# Patient Record
Sex: MALE | Race: WHITE | Employment: OTHER | URBAN - METROPOLITAN AREA
[De-identification: names, ages, dates, MRNs, and addresses within clinical notes are randomized per-mention and may not be internally consistent; named-entity substitution may affect disease eponyms.]

---

## 2018-01-10 PROBLEM — G57.31 NEUROPATHY OF RIGHT PERONEAL NERVE: Status: ACTIVE | Noted: 2018-01-10

## 2018-01-10 PROCEDURE — 86235 NUCLEAR ANTIGEN ANTIBODY: CPT | Performed by: OTHER

## 2018-01-10 PROCEDURE — 86141 C-REACTIVE PROTEIN HS: CPT | Performed by: OTHER

## 2018-01-10 PROCEDURE — 86255 FLUORESCENT ANTIBODY SCREEN: CPT | Performed by: OTHER

## 2025-04-02 ENCOUNTER — TELEPHONE (OUTPATIENT)
Dept: SURGERY | Facility: CLINIC | Age: 65
End: 2025-04-02

## 2025-04-02 NOTE — TELEPHONE ENCOUNTER
Patient calling to schedule an appointment with an [facility] doctor for pulsatile tinnitus. Please advise if imaging needed for appointment as patient does not have any and he is scheduled tomorrow 4/3/25.

## 2025-04-02 NOTE — TELEPHONE ENCOUNTER
Message below noted.    Appointment scheduled for 4/03/25 is appropriate. Any imaging will be ordered at appointment.

## 2025-04-02 NOTE — TELEPHONE ENCOUNTER
Message below noted.    Patient scheduled for 4/03/25 for pulsatile tinnitus. No imaging noted in chart.    Routed to Provider.

## 2025-04-03 ENCOUNTER — OFFICE VISIT (OUTPATIENT)
Dept: SURGERY | Facility: CLINIC | Age: 65
End: 2025-04-03
Payer: MEDICARE

## 2025-04-03 VITALS
WEIGHT: 235 LBS | HEIGHT: 69 IN | DIASTOLIC BLOOD PRESSURE: 76 MMHG | SYSTOLIC BLOOD PRESSURE: 128 MMHG | OXYGEN SATURATION: 93 % | BODY MASS INDEX: 34.8 KG/M2 | HEART RATE: 67 BPM

## 2025-04-03 DIAGNOSIS — H93.13 TINNITUS OF BOTH EARS: Primary | ICD-10-CM

## 2025-04-03 PROCEDURE — 99205 OFFICE O/P NEW HI 60 MIN: CPT

## 2025-04-03 RX ORDER — AMLODIPINE BESYLATE 5 MG/1
5 TABLET ORAL 2 TIMES DAILY
COMMUNITY

## 2025-04-03 RX ORDER — BLOOD SUGAR DIAGNOSTIC
1 STRIP MISCELLANEOUS 2 TIMES DAILY
COMMUNITY

## 2025-04-03 RX ORDER — ORAL SEMAGLUTIDE 14 MG/1
1 TABLET ORAL DAILY
COMMUNITY

## 2025-04-03 RX ORDER — METOPROLOL SUCCINATE 50 MG/1
50 TABLET, EXTENDED RELEASE ORAL DAILY
COMMUNITY

## 2025-04-03 RX ORDER — LOSARTAN POTASSIUM AND HYDROCHLOROTHIAZIDE 12.5; 1 MG/1; MG/1
1 TABLET ORAL DAILY
COMMUNITY

## 2025-04-03 RX ORDER — BLOOD-GLUCOSE METER
1 KIT MISCELLANEOUS DAILY
COMMUNITY
Start: 2025-02-10

## 2025-04-03 NOTE — PATIENT INSTRUCTIONS
Refill policies:    Allow 2-3 business days for refills; controlled substances may take longer.  Contact your pharmacy at least 5 days prior to running out of medication and have them send an electronic request or submit request through the “request refill” option in your [employer] account.  Refills are not addressed on weekends; covering physicians do not authorize routine medications on weekends.  No narcotics or controlled substances are refilled after noon on Fridays or by on call physicians.  By law, narcotics must be electronically prescribed.  A 30 day supply with no refills is the maximum allowed.  If your prescription is due for a refill, you may be due for a follow up appointment.  To best provide you care, patients receiving routine medications need to be seen at least once a year.  Patients receiving narcotic/controlled substance medications need to be seen at least once every 3 months.  In the event that your preferred pharmacy does not have the requested medication in stock (e.g. Backordered), it is your responsibility to find another pharmacy that has the requested medication available.  We will gladly send a new prescription to that pharmacy at your request.    Scheduling Tests:    If your physician has ordered radiology tests such as MRI or CT scans, please contact Central Scheduling at [phone] right away to schedule the test.  Once scheduled, the [facility] Centralized Referral Team will work with your insurance carrier to obtain pre-certification or prior authorization.  Depending on your insurance carrier, approval may take 3-10 days.  It is highly recommended patients assure they have received an authorization before having a test performed.  If test is done without insurance authorization, patient may be responsible for the entire amount billed.      Precertification and Prior Authorizations:  If your physician has recommended that you have a procedure or additional testing performed the [facility]  Centralized Referral Team will contact your insurance carrier to obtain pre-certification or prior authorization.    You are strongly encouraged to contact your insurance carrier to verify that your procedure/test has been approved and is a COVERED benefit.  Although the [facility] Centralized Referral Team does its due diligence, the insurance carrier gives the disclaimer that \"Although the procedure is authorized, this does not guarantee payment.\"    Ultimately the patient is responsible for payment.   Thank you for your understanding in this matter.  Paperwork Completion:  If you require FMLA or disability paperwork for your recovery, please make sure to either drop it off or have it faxed to our office at [phone]. Be sure the form has your name and date of birth on it.  The form will be faxed to our Forms Department and they will complete it for you.  There is a 25$ fee for all forms that need to be filled out.  Please be aware there is a 10-14 day turnaround time.  You will need to sign a release of information (LUIS MIGUEL) form if your paperwork does not come with one.  You may call the Forms Department with any questions at [phone].  Their fax number is [phone].

## 2025-04-03 NOTE — PROGRESS NOTES
ORTIZ procedure in hospital No  New patient here for evaluation of pulsatile tinnitus, RONNIE, CPAP. Patient has a history of using smokeless tobacco.   Imaging and date of imaging: none  Family history of pulsatile tinnitus.   History of High Blood Pressure:  Yes  taking losartan, metoprolol, BP medication  Atorvastatin 20 mg,   Neurologist: none  Cardiologist: none  PCP: Dr. Florence-he will be retiring soon, so he will be finding a new PCP.   Review of Systems:    Hand Dominance: right  General: no symptoms reported  Neuro: no symptoms reported  Head:  pulsatile tinnitus, whooshing, both ears, positional   Musculoskeletal: knee and ankle pain   Cardiovascular: no symptoms reported  Gastrointestinal: heartburn  Genitourinary: no symptoms reported  Respiratory: no symptoms reported  Eyes: no symptoms reported-floater left eye  Skin: no symptoms reported  Mouth & throat: no symptoms reported  Neck: no symptoms reported  Nose: no symptoms reported  Psychiatric: no symptoms reported     MRS-0  Barthel- 100

## 2025-04-04 ENCOUNTER — TELEPHONE (OUTPATIENT)
Dept: SURGERY | Facility: CLINIC | Age: 65
End: 2025-04-04

## 2025-04-04 DIAGNOSIS — H93.13 TINNITUS OF BOTH EARS: Primary | ICD-10-CM

## 2025-04-04 NOTE — H&P
[facility]  Neurological Surgery Clinic Note    Sal Kaur  5/14/1960  [id number]  PCP: ANDRZEJ CARUSO MD    REASON FOR VISIT:  ***    HISTORY OF PRESENT ILLNESS:  Sal Kaur is a 64 year old male ***    PAST MEDICAL HISTORY:  No past medical history on file.    PAST SURGICAL HISTORY:  No past surgical history on file.    FAMILY HISTORY:  family history is not on file.    SOCIAL HISTORY:   reports that he has never smoked. He has quit using smokeless tobacco. He reports current alcohol use. He reports that he does not use drugs.    ALLERGIES:  Allergies[1]    MEDICATIONS:  Medications Ordered Prior to Encounter[2]    REVIEW OF SYSTEMS:  A 10-point system was reviewed.  Pertinent positives and negatives are noted in HPI.      PHYSICAL EXAMINATION:  VITAL SIGNS: /76 (BP Location: Right arm, Patient Position: Sitting, Cuff Size: large)   Pulse 67   Ht 69\"   Wt 235 lb (106.6 kg)   SpO2 93%   BMI 34.70 kg/m²     A&Ox3, no acute distress  PERRL, EOMi, FS, TM  Full strength x 4, no drift  Sensation intact   ***    ASSESSMENT:  ***    Plan:  ***    Jean Kirby MD  Neurological Surgery  [facility]    Care Time: *** min including face to face time, chart review, imaging interpretation, and coordination of care         [1] No Known Allergies  [2]   Current Outpatient Medications on File Prior to Visit   Medication Sig Dispense Refill    FERROUS SULFATE OR Take 1 tablet by mouth daily. 65 mg      Cholecalciferol 50 MCG (2000 UT) Oral Cap Take 50 mcg by mouth daily.      Losartan Potassium-HCTZ 100-12.5 MG Oral Tab Take 1 tablet by mouth daily.      metoprolol succinate ER 50 MG Oral Tablet 24 Hr Take 1 tablet (50 mg total) by mouth daily.      RYBELSUS 14 MG Oral Tab Take 1 tablet by mouth daily.      Blood Glucose Monitoring Suppl (ONETOUCH VERIO REFLECT) w/Device Does not apply Kit Apply 1  Application topically daily.      atorvastatin 20 MG Oral Tab Take 1 tablet by mouth in the evening 30 tablet     MetFORMIN HCl  MG Oral Tablet 24 Hr Take 2 tablets by mouth daily 60 tablet 0    amLODIPine 5 MG Oral Tab Take 1 tablet (5 mg total) by mouth 2 (two) times daily.      Glucose Blood (ONETOUCH VERIO) In Vitro Strip 1 strip by In Vitro route 2 (two) times daily.       No current facility-administered medications on file prior to visit.

## 2025-04-04 NOTE — TELEPHONE ENCOUNTER
Patient contacting the office stating that he attempted to schedule the requested imaging for MRI and MRA however, there is no order for sedated imaging. Patient is requesting the orders be updated to reflect sedation as he is claustrophobic. Please update the order and contact the patient directly so he can schedule. Thank you.

## 2025-04-07 NOTE — PROGRESS NOTES
[facility]  Interventional Neuroradiology Clinic Note      ANDRZEJ CARUSO MD  Primary Care      Date of Service: 4/3/2025     Dear Colleagues,     We had the pleasure of seeing Sal Kaur in the Interventional Neuroradiology Clinic at [facility] for tinnitus.     + constant, chronic, bilateral, \"pulsating\" tinnitus, more consistent recently when laying down at night, driving, or when upset (\"in situations where BP rises\"), turning head a certain way in bed sometimes helps it go away, 2-3/10 affecting ADLs    Review of Systems:     Hand Dominance: right  General: no symptoms reported  Neuro: no symptoms reported  Head:  pulsatile tinnitus, whooshing, both ears, positional   Musculoskeletal: knee and ankle pain   Cardiovascular: no symptoms reported  Gastrointestinal: heartburn  Genitourinary: no symptoms reported  Respiratory: no symptoms reported  Eyes: no symptoms reported-floater left eye  Skin: no symptoms reported  Mouth & throat: no symptoms reported  Neck: no symptoms reported  Nose: no symptoms reported  Psychiatric: no symptoms reported      MRS-0  Barthel- 100      Past Medical/Surgical History:  No past medical history on file.    No past surgical history on file.    Social History:    reports that he has never smoked. He has quit using smokeless tobacco. He reports current alcohol use. He reports that he does not use drugs.      Family History:  No family history on file.      Medications:    Current Outpatient Medications:     FERROUS SULFATE OR, Take 1 tablet by mouth daily. 65 mg, Disp: , Rfl:     Cholecalciferol 50 MCG (2000 UT) Oral Cap, Take 50 mcg by mouth daily., Disp: , Rfl:     Losartan Potassium-HCTZ 100-12.5 MG Oral Tab, Take 1 tablet by mouth daily., Disp: , Rfl:     metoprolol succinate ER 50 MG Oral Tablet 24 Hr, Take 1 tablet (50 mg total) by mouth daily., Disp: , Rfl:     RYBELSUS 14 MG Oral Tab, Take 1 tablet by mouth daily., Disp: , Rfl:     Blood  Glucose Monitoring Suppl (ONETOUCH VERIO REFLECT) w/Device Does not apply Kit, Apply 1 Application topically daily., Disp: , Rfl:     atorvastatin 20 MG Oral Tab, Take 1 tablet by mouth in the evening, Disp: 30 tablet, Rfl:     MetFORMIN HCl  MG Oral Tablet 24 Hr, Take 2 tablets by mouth daily, Disp: 60 tablet, Rfl: 0    amLODIPine 5 MG Oral Tab, Take 1 tablet (5 mg total) by mouth 2 (two) times daily., Disp: , Rfl:     Glucose Blood (ONETOUCH VERIO) In Vitro Strip, 1 strip by In Vitro route 2 (two) times daily., Disp: , Rfl:     Allergies:   Allergies[1]    Physical Exam:    Does not change sound with jugular compression    VITAL SIGNS: /76 (BP Location: Right arm, Patient Position: Sitting, Cuff Size: large)   Pulse 67   Ht 69\"   Wt 235 lb (106.6 kg)   SpO2 93%   BMI 34.70 kg/m²   GENERAL:  Patient is a 64 year old male in no acute distress.    NEUROLOGICAL:    Mental status: Oriented to person, place, and time   Speech: Fluent, no dysarthria  Memory and comprehension: Intact   Cranial Nerves: PERRL, EOMI, no nystagmus, facial sensation intact, face symmetric, tongue midline, shoulder shrug equal, remainder CN intact  Motor: No drift, no focal arm or leg weakness  Sensory: Intact to light touch  Gait: Deferred         Imaging: none to review         Assessment/Plan:      Bilateral tinnitus     - Obtain MRI brain IAC, MRA head + neck w/wo, & CTV     - Pt requests anesthesia for MR imaging due to extreme claustrophobia       Imaging reviewed and case discussed with Dr. Valencia    4/7/2025 9:12 AM      I spent approximately 60 minutes reviewing the patient's chart and imaging, with at least half the time spent on counseling the patient on their pathology and conservative medical management/treatment plan.       [1] No Known Allergies

## 2025-04-07 NOTE — TELEPHONE ENCOUNTER
Please call Renetta @ Central Scheduling would like a call back to go over the Imaging orders, for some reason  the MRI /MRA with Sedation is not showing on the order.  Please call her @ [phone]

## 2025-04-07 NOTE — TELEPHONE ENCOUNTER
Notified pt of the below- apologized for inconvenience. He will call central scheduling tomorrow and let me know if any further issues.

## 2025-04-07 NOTE — TELEPHONE ENCOUNTER
Orders have all been placed with anesthesia.     Please call patient and advise him to call and attempt to reschedule.     Thank you!

## 2025-04-07 NOTE — TELEPHONE ENCOUNTER
I have now spoken with people from central scheduling twice regarding these image orders. I have been placing them with anesthesia, and for some reason they are stating they are unable to see it on their end.     I just got confirmation that they now can see it. I don't know what was happening on their end.     Please advise patient and apologize for the inconvenience.    Thank you!

## 2025-04-07 NOTE — TELEPHONE ENCOUNTER
Message below noted.    Called patient to advise.     Patient acknowledged and appreciative of call.    Nothing needed further with this encounter.

## 2025-04-10 ENCOUNTER — HOSPITAL ENCOUNTER (OUTPATIENT)
Dept: CT IMAGING | Facility: HOSPITAL | Age: 65
Discharge: HOME | End: 2025-04-10
Payer: MEDICARE

## 2025-04-10 DIAGNOSIS — H93.13 TINNITUS OF BOTH EARS: ICD-10-CM

## 2025-04-10 LAB
CREAT BLD-MCNC: 1.5 MG/DL (ref 0.7–1.3)
EGFRCR SERPLBLD CKD-EPI 2021: 51 ML/MIN/1.73M2 (ref 60–?)

## 2025-04-10 PROCEDURE — 70496 CT ANGIOGRAPHY HEAD: CPT

## 2025-04-10 PROCEDURE — 82565 ASSAY OF CREATININE: CPT

## 2025-05-12 VITALS — BODY MASS INDEX: 33.64 KG/M2 | HEIGHT: 70 IN | WEIGHT: 235 LBS

## 2025-05-12 RX ORDER — NICOTINE POLACRILEX 4 MG
30 LOZENGE BUCCAL
OUTPATIENT
Start: 2025-05-12

## 2025-05-12 RX ORDER — NICOTINE POLACRILEX 4 MG
15 LOZENGE BUCCAL
OUTPATIENT
Start: 2025-05-12

## 2025-05-12 RX ORDER — DEXTROSE MONOHYDRATE 25 G/50ML
50 INJECTION, SOLUTION INTRAVENOUS
OUTPATIENT
Start: 2025-05-12

## 2025-05-12 RX ORDER — SODIUM CHLORIDE 9 MG/ML
INJECTION, SOLUTION INTRAVENOUS CONTINUOUS
OUTPATIENT
Start: 2025-05-12

## 2025-05-14 NOTE — IMAGING NOTE
Spoke with Epifanio sánchez/srinivasan Garciabelsus (semaglutide, GLP1 inhibitor). Epifanio reports he takes this daily and instruction given to hold Rybelsus  the morning of the MRI. Please also hold metformin and losartan the morning of the MRI.

## 2025-05-19 ENCOUNTER — ANESTHESIA EVENT (OUTPATIENT)
Dept: MRI IMAGING | Facility: HOSPITAL | Age: 65
End: 2025-05-19
Payer: MEDICARE

## 2025-05-19 ENCOUNTER — ANESTHESIA (OUTPATIENT)
Dept: MRI IMAGING | Facility: HOSPITAL | Age: 65
End: 2025-05-19
Payer: MEDICARE

## 2025-05-19 ENCOUNTER — HOSPITAL ENCOUNTER (OUTPATIENT)
Dept: MRI IMAGING | Facility: HOSPITAL | Age: 65
Discharge: HOME | End: 2025-05-19
Payer: MEDICARE

## 2025-05-19 ENCOUNTER — APPOINTMENT (OUTPATIENT)
Dept: LAB | Facility: HOSPITAL | Age: 65
End: 2025-05-19
Payer: MEDICARE

## 2025-05-19 ENCOUNTER — TELEPHONE (OUTPATIENT)
Dept: SURGERY | Facility: CLINIC | Age: 65
End: 2025-05-19

## 2025-05-19 VITALS
BODY MASS INDEX: 33.64 KG/M2 | TEMPERATURE: 98 F | HEART RATE: 65 BPM | HEIGHT: 70 IN | SYSTOLIC BLOOD PRESSURE: 124 MMHG | DIASTOLIC BLOOD PRESSURE: 75 MMHG | WEIGHT: 235 LBS | RESPIRATION RATE: 16 BRPM | OXYGEN SATURATION: 94 %

## 2025-05-19 DIAGNOSIS — H93.13 TINNITUS OF BOTH EARS: ICD-10-CM

## 2025-05-19 LAB
GLUCOSE BLD-MCNC: 111 MG/DL (ref 70–99)
GLUCOSE BLD-MCNC: 127 MG/DL (ref 70–99)

## 2025-05-19 PROCEDURE — 70546 MR ANGIOGRAPH HEAD W/O&W/DYE: CPT

## 2025-05-19 PROCEDURE — 70553 MRI BRAIN STEM W/O & W/DYE: CPT

## 2025-05-19 PROCEDURE — A9575 INJ GADOTERATE MEGLUMI 0.1ML: HCPCS

## 2025-05-19 PROCEDURE — 70549 MR ANGIOGRAPH NECK W/O&W/DYE: CPT

## 2025-05-19 PROCEDURE — 82962 GLUCOSE BLOOD TEST: CPT

## 2025-05-19 RX ORDER — DEXTROSE MONOHYDRATE 25 G/50ML
50 INJECTION, SOLUTION INTRAVENOUS
Status: DISCONTINUED | OUTPATIENT
Start: 2025-05-19 | End: 2025-05-21

## 2025-05-19 RX ORDER — SODIUM CHLORIDE, SODIUM LACTATE, POTASSIUM CHLORIDE, CALCIUM CHLORIDE 600; 310; 30; 20 MG/100ML; MG/100ML; MG/100ML; MG/100ML
INJECTION, SOLUTION INTRAVENOUS CONTINUOUS PRN
Status: DISCONTINUED | OUTPATIENT
Start: 2025-05-19 | End: 2025-05-19 | Stop reason: SURG

## 2025-05-19 RX ORDER — HYDROMORPHONE HYDROCHLORIDE 1 MG/ML
0.2 INJECTION, SOLUTION INTRAMUSCULAR; INTRAVENOUS; SUBCUTANEOUS EVERY 5 MIN PRN
Status: DISCONTINUED | OUTPATIENT
Start: 2025-05-19 | End: 2025-05-19

## 2025-05-19 RX ORDER — HYDROCODONE BITARTRATE AND ACETAMINOPHEN 5; 325 MG/1; MG/1
1 TABLET ORAL ONCE AS NEEDED
Status: DISCONTINUED | OUTPATIENT
Start: 2025-05-19 | End: 2025-05-19

## 2025-05-19 RX ORDER — LIDOCAINE HYDROCHLORIDE 10 MG/ML
INJECTION, SOLUTION EPIDURAL; INFILTRATION; INTRACAUDAL; PERINEURAL AS NEEDED
Status: DISCONTINUED | OUTPATIENT
Start: 2025-05-19 | End: 2025-05-19 | Stop reason: SURG

## 2025-05-19 RX ORDER — ROCURONIUM BROMIDE 10 MG/ML
INJECTION, SOLUTION INTRAVENOUS AS NEEDED
Status: DISCONTINUED | OUTPATIENT
Start: 2025-05-19 | End: 2025-05-19 | Stop reason: SURG

## 2025-05-19 RX ORDER — HYDROCODONE BITARTRATE AND ACETAMINOPHEN 5; 325 MG/1; MG/1
2 TABLET ORAL ONCE AS NEEDED
Status: DISCONTINUED | OUTPATIENT
Start: 2025-05-19 | End: 2025-05-19

## 2025-05-19 RX ORDER — ONDANSETRON 2 MG/ML
4 INJECTION INTRAMUSCULAR; INTRAVENOUS EVERY 6 HOURS PRN
Status: DISCONTINUED | OUTPATIENT
Start: 2025-05-19 | End: 2025-05-21

## 2025-05-19 RX ORDER — HYDROMORPHONE HYDROCHLORIDE 1 MG/ML
0.6 INJECTION, SOLUTION INTRAMUSCULAR; INTRAVENOUS; SUBCUTANEOUS EVERY 5 MIN PRN
Status: DISCONTINUED | OUTPATIENT
Start: 2025-05-19 | End: 2025-05-19

## 2025-05-19 RX ORDER — HYDROMORPHONE HYDROCHLORIDE 1 MG/ML
0.4 INJECTION, SOLUTION INTRAMUSCULAR; INTRAVENOUS; SUBCUTANEOUS EVERY 5 MIN PRN
Status: DISCONTINUED | OUTPATIENT
Start: 2025-05-19 | End: 2025-05-19

## 2025-05-19 RX ORDER — NICOTINE POLACRILEX 4 MG
15 LOZENGE BUCCAL
Status: DISCONTINUED | OUTPATIENT
Start: 2025-05-19 | End: 2025-05-21

## 2025-05-19 RX ORDER — DEXAMETHASONE SODIUM PHOSPHATE 4 MG/ML
VIAL (ML) INJECTION AS NEEDED
Status: DISCONTINUED | OUTPATIENT
Start: 2025-05-19 | End: 2025-05-19 | Stop reason: SURG

## 2025-05-19 RX ORDER — NICOTINE POLACRILEX 4 MG
30 LOZENGE BUCCAL
Status: DISCONTINUED | OUTPATIENT
Start: 2025-05-19 | End: 2025-05-21

## 2025-05-19 RX ORDER — SODIUM CHLORIDE, SODIUM LACTATE, POTASSIUM CHLORIDE, CALCIUM CHLORIDE 600; 310; 30; 20 MG/100ML; MG/100ML; MG/100ML; MG/100ML
INJECTION, SOLUTION INTRAVENOUS CONTINUOUS
Status: DISCONTINUED | OUTPATIENT
Start: 2025-05-19 | End: 2025-05-21

## 2025-05-19 RX ORDER — ACETAMINOPHEN 500 MG
1000 TABLET ORAL ONCE AS NEEDED
Status: DISCONTINUED | OUTPATIENT
Start: 2025-05-19 | End: 2025-05-19

## 2025-05-19 RX ORDER — EPHEDRINE SULFATE 50 MG/ML
INJECTION INTRAVENOUS AS NEEDED
Status: DISCONTINUED | OUTPATIENT
Start: 2025-05-19 | End: 2025-05-19 | Stop reason: SURG

## 2025-05-19 RX ORDER — METOCLOPRAMIDE HYDROCHLORIDE 5 MG/ML
10 INJECTION INTRAMUSCULAR; INTRAVENOUS EVERY 8 HOURS PRN
Status: DISCONTINUED | OUTPATIENT
Start: 2025-05-19 | End: 2025-05-21

## 2025-05-19 RX ORDER — ONDANSETRON 2 MG/ML
INJECTION INTRAMUSCULAR; INTRAVENOUS AS NEEDED
Status: DISCONTINUED | OUTPATIENT
Start: 2025-05-19 | End: 2025-05-19 | Stop reason: SURG

## 2025-05-19 RX ORDER — NALOXONE HYDROCHLORIDE 0.4 MG/ML
0.08 INJECTION, SOLUTION INTRAMUSCULAR; INTRAVENOUS; SUBCUTANEOUS AS NEEDED
Status: DISCONTINUED | OUTPATIENT
Start: 2025-05-19 | End: 2025-05-19

## 2025-05-19 RX ORDER — GADOTERATE MEGLUMINE 376.9 MG/ML
20 INJECTION INTRAVENOUS
Status: COMPLETED | OUTPATIENT
Start: 2025-05-19 | End: 2025-05-19

## 2025-05-19 RX ADMIN — DEXAMETHASONE SODIUM PHOSPHATE 4 MG: 4 MG/ML VIAL (ML) INJECTION at 13:07:00

## 2025-05-19 RX ADMIN — EPHEDRINE SULFATE 5 MG: 50 INJECTION INTRAVENOUS at 13:24:00

## 2025-05-19 RX ADMIN — LIDOCAINE HYDROCHLORIDE 50 MG: 10 INJECTION, SOLUTION EPIDURAL; INFILTRATION; INTRACAUDAL; PERINEURAL at 13:07:00

## 2025-05-19 RX ADMIN — GADOTERATE MEGLUMINE 20 ML: 376.9 INJECTION INTRAVENOUS at 15:01:00

## 2025-05-19 RX ADMIN — EPHEDRINE SULFATE 5 MG: 50 INJECTION INTRAVENOUS at 13:33:00

## 2025-05-19 RX ADMIN — EPHEDRINE SULFATE 5 MG: 50 INJECTION INTRAVENOUS at 13:14:00

## 2025-05-19 RX ADMIN — ROCURONIUM BROMIDE 70 MG: 10 INJECTION, SOLUTION INTRAVENOUS at 13:07:00

## 2025-05-19 RX ADMIN — SODIUM CHLORIDE, SODIUM LACTATE, POTASSIUM CHLORIDE, CALCIUM CHLORIDE: 600; 310; 30; 20 INJECTION, SOLUTION INTRAVENOUS at 13:00:00

## 2025-05-19 RX ADMIN — ONDANSETRON 4 MG: 2 INJECTION INTRAMUSCULAR; INTRAVENOUS at 14:40:00

## 2025-05-19 NOTE — TELEPHONE ENCOUNTER
Please call patient and arrange follow up visit with Dr. Valencia to discuss most recent imaging results.     Thank you!

## 2025-05-19 NOTE — ANESTHESIA PROCEDURE NOTES
Airway  Date/Time: 5/19/2025 1:09 PM  Reason: elective      General Information and Staff   Patient location during procedure: OR  Anesthesiologist: Yamil Collado MD  Performed: anesthesiologist   Performed by: Yamil Collado MD  Authorized by: Yamil Collado MD        Indications and Patient Condition  Indications for airway management: anesthesia  Sedation level: deep      Preoxygenated: yesPatient position: sniffing    Mask difficulty assessment: 1 - vent by mask    Final Airway Details    Final airway type: endotracheal airway    Successful airway: ETT  Cuffed: yes   Successful intubation technique: Video laryngoscopy  Endotracheal tube insertion site: oral  Blade: GlideScope  Blade size: #3  ETT size (mm): 7.5    Placement verified by: capnometry   Cuff volume (mL): 8  Measured from: lips  ETT to lips (cm): 22  Number of attempts at approach: 1    Additional Comments  atraumatic

## 2025-05-19 NOTE — TELEPHONE ENCOUNTER
Message below noted.    Patient to be scheduled with Dr. Valencia to discuss most recent imaging results.    Noted message was routed to  to assist patient with scheduling.

## 2025-05-19 NOTE — ANESTHESIA POSTPROCEDURE EVALUATION
[facility]    Sal Kaur Patient Status:  Outpatient   Age/Gender 65 year old male MRN [id number]   Location [facility] MRI Attending Keara Cabrera APRN   Hosp Day # 0 PCP ANDRZEJ CARUSO MD       Anesthesia Post-op Note        Procedure Summary       Date: 05/19/25 Room / Location: [facility]    Anesthesia Start: 1300 Anesthesia Stop: 1452    Procedure: MRA BRAIN (W+WO) (CPT=70546) Diagnosis: Tinnitus of both ears    Scheduled Providers: Yamil Collado MD Anesthesiologist:     Anesthesia Type: general ASA Status: 3            Anesthesia Type: general    Vitals Value Taken Time   /71 05/19/25 14:48   Temp 97 05/19/25 14:52   Pulse 65 05/19/25 14:52   Resp 11 05/19/25 14:52   SpO2 91 % 05/19/25 14:52   Vitals shown include unfiled device data.        Patient Location: PACU    Anesthesia Type: general    Airway Patency: patent    Postop Pain Control: adequate    Mental Status: mildly sedated but able to meaningfully participate in the post-anesthesia evaluation    Nausea/Vomiting: none    Cardiopulmonary/Hydration status: stable euvolemic    Complications: no apparent anesthesia related complications    Postop vital signs: stable    Dental Exam: Unchanged from Preop    Patient to be discharged from PACU when criteria met.

## 2025-05-19 NOTE — ANESTHESIA PREPROCEDURE EVALUATION
PRE-OP EVALUATION    Patient Name: Sal Kaur    Admit Diagnosis: Tinnitus of both ears [H93.13]    Pre-op Diagnosis: * No pre-op diagnosis entered *        Anesthesia Procedure: MRA BRAIN (W+WO) (CPT=70546)    * No surgeons found in log *    Pre-op vitals reviewed.  Temp: 97.9 °F (36.6 °C)  Pulse: 57  Resp: 8  BP: 160/86  SpO2: 96 %  Body mass index is 33.72 kg/m².    Current medications reviewed.  Hospital Medications:  Current Medications[1]    Outpatient Medications:   Prescriptions Prior to Admission[2]    Allergies: Penicillins      Anesthesia Evaluation    Patient summary reviewed.    Anesthetic Complications  (-) history of anesthetic complications         GI/Hepatic/Renal      (+) GERD                           Cardiovascular                (+) obesity  (+) hypertension                                     Endo/Other      (+) diabetes                            Pulmonary                    (+) sleep apnea       Neuro/Psych                 (+) neuromuscular disease                     Past Surgical History[3]  Social Hx on file[4]  History   Drug Use No     Available pre-op labs reviewed.               Airway      Mallampati: III  Mouth opening: 3 FB  TM distance: 4 - 6 cm   Cardiovascular             Dental  Comment: No loose teeth per patient               Pulmonary                     Other findings              ASA: 3   Plan: general  NPO status verified and     Post-procedure pain management plan discussed with surgeon and patient.    Comment: GETA/LMA discussed in detail.  Risk of complications discussed including but not limited to sore throat, cough, PONV discussed. Also, discussed risks including dental injury particularly on any weakened, treated or diseased teeth & pt wishes to proceed  All questions answered.    Plan/risks discussed with: patient                Present on Admission:  **None**             [1]   No current facility-administered medications on file as of 5/19/2025.    [2] (Not in a hospital admission)  [3]   Past Surgical History:  Procedure Laterality Date    Appendectomy      Other surgical history      varicose veins    Other surgical history Right     knee surgical   [4]   Social History  Socioeconomic History    Marital status:    Tobacco Use    Smoking status: Some Days     Types: Cigars    Smokeless tobacco: Former   Vaping Use    Vaping status: Never Used   Substance and Sexual Activity    Alcohol use: Yes     Comment: very seldom    Drug use: No

## 2025-06-05 ENCOUNTER — OFFICE VISIT (OUTPATIENT)
Dept: SURGERY | Facility: CLINIC | Age: 65
End: 2025-06-05
Payer: MEDICARE

## 2025-06-05 VITALS
DIASTOLIC BLOOD PRESSURE: 70 MMHG | BODY MASS INDEX: 34.8 KG/M2 | SYSTOLIC BLOOD PRESSURE: 140 MMHG | HEART RATE: 61 BPM | WEIGHT: 235 LBS | HEIGHT: 69 IN | OXYGEN SATURATION: 96 %

## 2025-06-05 DIAGNOSIS — F41.9 ANXIETY: ICD-10-CM

## 2025-06-05 DIAGNOSIS — I67.1 CEREBRAL ANEURYSM (HCC): Primary | ICD-10-CM

## 2025-06-05 RX ORDER — DIAZEPAM 5 MG/1
5 TABLET ORAL AS DIRECTED
Qty: 2 TABLET | Refills: 0 | Status: SHIPPED | OUTPATIENT
Start: 2025-06-05

## 2025-06-05 NOTE — PROGRESS NOTES
[facility]  Interventional Neuroradiology Clinic Note      ANDRZEJ CARUSO MD  Primary Care      Date of Service: 6/5/2025    Dear Colleagues,     We had the pleasure of seeing Sal Kaur in the Interventional Neuroradiology Clinic at [facility] for follow up after obtaining numerous imaging studies for complaints of tinnitus.     On workup, an incidental 6mm R M1 aneurysm was identified.     + constant, chronic, bilateral, \"pulsating\" tinnitus, more consistent recently when laying down at night, driving, or when upset (\"in situations where BP rises\"), turning head a certain way in bed sometimes helps it go away, 2-3/10 affecting ADLs     Review of Systems:     Hand Dominance: right  General: no symptoms reported  Neuro: no symptoms reported  Head: no symptoms reported  Musculoskeletal: no symptoms reported  Cardiovascular: no symptoms reported  Gastrointestinal: no symptoms reported  Genitourinary: no symptoms reported  Respiratory: snoring  Eyes: dry eyes, uses eye drops  Skin: no symptoms reported  Mouth & throat: no symptoms reported  Neck: no symptoms reported  Nose: no symptoms reported  Psychiatric: no symptoms reported        Barthel index:100    Past Medical/Surgical History:  Past Medical History[1]    Past Surgical History[2]    Social History:    reports that he has been smoking cigars. He has quit using smokeless tobacco. He reports current alcohol use. He reports that he does not use drugs.    Family History:  Family History[3]    Medications:  Medications - Current[4]    Allergies:   Allergies[5]    Physical Exam:  /70 (BP Location: Right arm, Patient Position: Sitting, Cuff Size: large)   Pulse 61   Ht 69\"   Wt 235 lb (106.6 kg)   SpO2 96%   BMI 34.70 kg/m²      NEUROLOGICAL:    Mental status: Oriented to person, place, and time   Speech: Fluent, no dysarthria  Memory and comprehension: Intact   Cranial Nerves: PERRL, EOMI, no nystagmus, facial  sensation intact, face symmetric, tongue midline, shoulder shrug equal, remainder CN intact  Motor: No drift, no focal arm or leg weakness  Sensory: Intact to light touch  Gait: Deferred         Imaging: all reviewed by Dr. Valencia     5/19/2025 MRA carotids w/wo  CONCLUSION:  Unremarkable MR angiogram neck examination.  Please correlate with MR angiogram head examination regarding right MCA aneurysm.     5/19/2025 MRI brain IAC   CONCLUSION:       1. No evidence of an acute infarct.  No enhancing lesions.      2. Minimal FLAIR abnormalities in the white matter are statistically likely sequelae of chronic small vessel ischemic disease.  This can also be seen with migraine headaches.      3. Bilateral cranial nerves 5, 7 and 8 are unremarkable.  Bilateral Meckel's caves are overall unremarkable.  Bilateral internal auditory canals are unremarkable.       5/19/2025 MRA brain w/wo   CONCLUSION:  Distal right M1 segment 6 mm aneurysm is identified.     4/10/2025 CTV   CONCLUSION:       1. No acute intracranial abnormality identified.      2. No significant intracranial venous thrombosis identified.      3. Nonspecific enlargement of the pituitary gland measuring 17 x 11 x 12 mm with an underlying macroadenoma or other etiologies not entirely excluded.  Clinical correlation recommended.  Dedicated MRI of the brain and pituitary gland with and without   contrast is suggested for further evaluation.           Assessment/Plan:     Tinnitus   - No vascular etiology identified       Incidental 6mm R M1 aneurysm  - Will monitoring with noninvasive imaging with MRA VWI for his 6 months follow up (Nov 2025); will provide pt with sedation medications per his request due to claustrophobia.    - Educated to seek emergency medical attention should he experience any severe headache and/or neurological deficit       HAYLEY Vela      Imaging reviewed and case discussed with Dr. Valencia     6/5/2025 1:05 PM      I spent  approximately 30 minutes reviewing the patient's chart and imaging, with at least half the time spent on counseling the patient on her pathology and conservative medical management/treatment plan.                 [1]   Past Medical History:   Diabetes (HCC)    Esophageal reflux    High blood pressure    High cholesterol    Sleep apnea    Visual impairment    glasses/contacts   [2]   Past Surgical History:  Procedure Laterality Date    Appendectomy      Other surgical history      varicose veins    Other surgical history Right     knee surgical   [3] No family history on file.  [4]   Current Outpatient Medications:     FERROUS SULFATE OR, Take 1 tablet by mouth in the morning. 65 mg., Disp: , Rfl:     Cholecalciferol 50 MCG (2000 UT) Oral Cap, Take 50 mcg by mouth in the morning., Disp: , Rfl:     Losartan Potassium-HCTZ 100-12.5 MG Oral Tab, Take 1 tablet by mouth in the morning., Disp: , Rfl:     metoprolol succinate ER 50 MG Oral Tablet 24 Hr, Take 1 tablet (50 mg total) by mouth in the morning., Disp: , Rfl:     RYBELSUS 14 MG Oral Tab, Take 1 tablet by mouth in the morning., Disp: , Rfl:     amLODIPine 5 MG Oral Tab, Take 1 tablet (5 mg total) by mouth in the morning and 1 tablet (5 mg total) before bedtime., Disp: , Rfl:     Glucose Blood (ONETOUCH VERIO) In Vitro Strip, 1 strip by In Vitro route in the morning and 1 strip before bedtime., Disp: , Rfl:     Blood Glucose Monitoring Suppl (ONETOUCH VERIO REFLECT) w/Device Does not apply Kit, Apply 1 Application topically in the morning., Disp: , Rfl:     atorvastatin 20 MG Oral Tab, Take 1 tablet by mouth in the evening, Disp: 30 tablet, Rfl:     MetFORMIN HCl  MG Oral Tablet 24 Hr, Take 2 tablets by mouth daily, Disp: 60 tablet, Rfl: 0  [5]   Allergies  Allergen Reactions    Penicillins OTHER (SEE COMMENTS)     At age 3

## 2025-06-05 NOTE — PROGRESS NOTES
[facility] rooming For established patients  Patient here for review MRI, bilateral tinnitus  Last office visit on 4/3/25  New imaging since last seen: CT venography Brain  date 4/10/25  MRA brain, MRA IAC, and MRA carotids all done on 5/19/25  Last procedure: none   Changes since LOV: no changes since last visit, tinnitus still present. Gets worse when driving long distances.   Anticoagulants: None    Review of Systems:    Hand Dominance: right  General: no symptoms reported  Neuro: no symptoms reported  Head: no symptoms reported  Musculoskeletal: no symptoms reported  Cardiovascular: no symptoms reported  Gastrointestinal: no symptoms reported  Genitourinary: no symptoms reported  Respiratory: snoring  Eyes: dry eyes, uses eye drops  Skin: no symptoms reported  Mouth & throat: no symptoms reported  Neck: no symptoms reported  Nose: no symptoms reported  Psychiatric: no symptoms reported      Barthel index:100

## 2025-06-17 ENCOUNTER — TELEPHONE (OUTPATIENT)
Dept: SURGERY | Facility: CLINIC | Age: 65
End: 2025-06-17

## 2025-06-17 NOTE — TELEPHONE ENCOUNTER
Called and spoke with patient.     Again, advised him that his MRA is not cornel till November and I have already provided him with oral sedation medications to take prior.     Ideally he attempts this with these medications so that all future scans do not need to be completed with anesthesia, as this poses other unnecessary health risks to the patient, especially if done routinely.     Pt stated understanding and appreciation for the call. For now, he would like to continue as previously arranged.     Please be advised.

## 2025-06-17 NOTE — TELEPHONE ENCOUNTER
Message below noted.    Provider spoke with patient directly.    Nothing needed further with this encounter.

## 2025-06-17 NOTE — TELEPHONE ENCOUNTER
Message below noted    Patient requesting MRA order to include anesthesia as he is very claustrophobic.    LOV 6/05/25  \"Assessment/Plan:     Tinnitus   - No vascular etiology identified         Incidental 6mm R M1 aneurysm  - Will monitoring with noninvasive imaging with MRA [facility]I for his 6 months follow up (Nov 2025); will provide pt with sedation medications per his request due to claustrophobia.     - Educated to seek emergency medical attention should he experience any severe headache and/or neurological deficit\"    Routed to Provider.

## (undated) NOTE — LETTER
[address]  [address]    Consent for Anesthesia    I, Sal Kaur agree to be cared for by a physician anesthesiologist alone and/or with a nurse anesthetist, who is specially trained to monitor me and give me medicine to put me to sleep or keep me comfortable during my procedure    I understand that my anesthesiologist and/or anesthetist is not an employee or agent of [facility] or [employer]. He or she works for [employer].    As the patient asking for anesthesia services, I agree to:  Allow the anesthesiologist (anesthesia doctor) to give me medicine and do additional procedures as necessary. Some examples are: Starting or using an “IV” to give me medicine, fluids or blood during my procedure, and having a breathing tube placed to help me breathe when I’m asleep (intubation). In the event that my heart stops working properly, I understand that my anesthesiologist will make every effort to sustain my life, unless otherwise directed by [facility] Do Not Resuscitate documents.  Tell my anesthesia doctor before my procedure:   If I am pregnant.   The last time that I ate or drank.  iii. All of the medicines I take (including prescriptions, herbal supplements, and pills I can buy without a prescription (including street drugs/illegal medications). Failure to inform my anesthesiologist about these medicines may increase my risk of anesthetic complications.  Iv.If I am allergic to anything or have had a reaction to anesthesia before.  I understand how the anesthesia medicine will help me (benefits).  I understand that with any type of anesthesia medicine there are risks:  The most common risks are: nausea, vomiting, sore throat, muscle soreness, damage to my eyes, mouth, or teeth (from breathing tube placement).  Rare risks include: remembering what happened during my procedure, allergic reactions to medications, injury to my  airway, heart, lungs, vision, nerves, or muscles and in extremely rare instances death.  My doctor has explained to me other choices available to me for my care (alternatives).  Pregnant Patients (“epidural”):  I understand that the risks of having an epidural (medicine given into my back to help control pain during labor), include itching, low blood pressure, difficulty urinating, headache or slowing of the baby’s heart. Very rare risks include infection, bleeding, seizure, irregular heart rhythms and nerve injury.  Regional Anesthesia (“spinal”, “epidural”, & “nerve blocks”):  I understand that rare but potential complications include headache, bleeding, infection, seizure, irregular heart rhythms, and nerve injury.    _____________________________________________________________________________  Patient (or Representative) Signature/Relationship to Patient  Date   Time    _____________________________________________________________________________   Name (if used)    Language/Organization   Time    _____________________________________________________________________________  Nurse Anesthetist Signature     Date   Time    ______________________________________________________________________________  Anesthesiologist Signature     Date   Time  I have discussed the procedure and information above with the patient (or patient’s representative) and answered their questions. The patient or their representative has agreed to have anesthesia services.    _____________________________________________________________________________  Witness       Date   Time  I have verified that the signature is that of the patient or patient’s representative, and that it was signed before the procedure    Patient Name: Sal Kaur     : 1960                 Printed: 2025 at 3:01 PM    Medical Record #: [id number]                                            Page 1 of 1